# Patient Record
Sex: FEMALE | Race: WHITE | NOT HISPANIC OR LATINO | URBAN - METROPOLITAN AREA
[De-identification: names, ages, dates, MRNs, and addresses within clinical notes are randomized per-mention and may not be internally consistent; named-entity substitution may affect disease eponyms.]

---

## 2019-10-10 ENCOUNTER — OFFICE VISIT (OUTPATIENT)
Dept: PODIATRY | Facility: CLINIC | Age: 24
End: 2019-10-10
Payer: COMMERCIAL

## 2019-10-10 VITALS — HEIGHT: 64 IN | BODY MASS INDEX: 19.29 KG/M2 | WEIGHT: 113 LBS

## 2019-10-10 DIAGNOSIS — M79.672 LEFT FOOT PAIN: ICD-10-CM

## 2019-10-10 DIAGNOSIS — M72.2 PLANTAR FASCIITIS: Primary | ICD-10-CM

## 2019-10-10 PROCEDURE — 20550 NJX 1 TENDON SHEATH/LIGAMENT: CPT | Performed by: PODIATRIST

## 2019-10-10 PROCEDURE — 99203 OFFICE O/P NEW LOW 30 MIN: CPT | Performed by: PODIATRIST

## 2019-10-10 RX ORDER — LEVETIRACETAM 250 MG/1
250 TABLET ORAL 2 TIMES DAILY
COMMUNITY

## 2019-10-10 RX ORDER — TOPIRAMATE 100 MG/1
100 TABLET, FILM COATED ORAL 2 TIMES DAILY
COMMUNITY

## 2019-10-10 RX ORDER — LIDOCAINE HYDROCHLORIDE 10 MG/ML
1 INJECTION, SOLUTION INFILTRATION; PERINEURAL ONCE
Status: COMPLETED | OUTPATIENT
Start: 2019-10-10 | End: 2019-10-10

## 2019-10-10 RX ORDER — SUMATRIPTAN 100 MG/1
100 TABLET, FILM COATED ORAL
COMMUNITY

## 2019-10-10 RX ORDER — BUPIVACAINE HYDROCHLORIDE 5 MG/ML
1 INJECTION, SOLUTION EPIDURAL; INTRACAUDAL ONCE
Status: COMPLETED | OUTPATIENT
Start: 2019-10-10 | End: 2019-10-10

## 2019-10-10 RX ORDER — VENLAFAXINE 75 MG/1
75 TABLET ORAL
COMMUNITY

## 2019-10-10 RX ORDER — TRIAMCINOLONE ACETONIDE 40 MG/ML
20 INJECTION, SUSPENSION INTRA-ARTICULAR; INTRAMUSCULAR ONCE
Status: COMPLETED | OUTPATIENT
Start: 2019-10-10 | End: 2019-10-10

## 2019-10-10 RX ADMIN — BUPIVACAINE HYDROCHLORIDE 1 ML: 5 INJECTION, SOLUTION EPIDURAL; INTRACAUDAL at 16:04

## 2019-10-10 RX ADMIN — LIDOCAINE HYDROCHLORIDE 1 ML: 10 INJECTION, SOLUTION INFILTRATION; PERINEURAL at 16:04

## 2019-10-10 RX ADMIN — TRIAMCINOLONE ACETONIDE 20 MG: 40 INJECTION, SUSPENSION INTRA-ARTICULAR; INTRAMUSCULAR at 16:04

## 2019-10-10 NOTE — PROGRESS NOTES
Assessment/Plan:    Reviewed MRI results brought by patient  They were positive for a plantar fibroma  Foot injection     Date/Time 10/10/2019 4:09 PM     Performed by  Haroldo Finley DPM     Authorized by Haroldo Finley DPM      Universal Protocol Consent: Verbal consent obtained  Consent given by: patient  Patient understanding: patient states understanding of the procedure being performed  Patient identity confirmed: verbally with patient        Site preparation: Isopropyl alcohol    Local anesthesia used: yes     Anesthesia   Local anesthesia used: yes  Local Anesthetic: lidocaine 1% without epinephrine and bupivacaine 0 5% without epinephrine     Procedure Details   Procedure Notes: Injected left plantar fascia (fibroma) with 0 5 cc Kenalog 40 along with 1 cc 1% xylocaine and 1 cc 0 5% Marcaine  Explained the patient that her symptoms are consistent with a plantar fibroma  Discussed etiology and treatment options  Explained that the thickened band can be removed but that it frequently recurs  Recommended periodic cortisone injection as long as relieved her discomfort  Injected the left plantar fascia with 0 5 cc Kenalog 40 along with 1 cc 1% xylocaine and 1 cc 0 5% Marcaine  No problem-specific Assessment & Plan notes found for this encounter  Diagnoses and all orders for this visit:    Plantar fasciitis  -     bupivacaine (PF) (MARCAINE) 0 5 % injection 1 mL  -     lidocaine (XYLOCAINE) 1 % injection 1 mL  -     triamcinolone acetonide (KENALOG-40) 40 mg/mL injection 20 mg    Left foot pain    Other orders  -     venlafaxine (EFFEXOR) 75 mg tablet; Take 75 mg by mouth  -     topiramate (TOPAMAX) 100 mg tablet; Take 100 mg by mouth 2 (two) times a day  -     SUMAtriptan (IMITREX) 100 mg tablet; Take 100 mg by mouth  -     levETIRAcetam (KEPPRA) 250 mg tablet; Take 250 mg by mouth 2 (two) times a day          Subjective:      Patient ID: Karan Ricks is a 22 y o  female      HPI Patient presents with pain in her left foot along the medial band of the left plantar fascia  Patient states that she has been in pain for approximately 1 year  She notes a thickening in the fascial band and also a small lump  Patient had a MRI performed and it was read as suggestive for plantar fibromatosis  Patient has had 1 cortisone injection months ago with questionable results  She states that at her worst days the pain is a 7/10  For the most part is at a 6/10, though there are times when she does not feel it  Patient is in the [employer]  She states that she has PTSD  The following portions of the patient's history were reviewed and updated as appropriate: allergies, current medications, past family history, past medical history, past social history, past surgical history and problem list     Review of Systems   Constitutional: Negative  Genitourinary: Negative  Musculoskeletal: Negative  Neurological: Negative  Psychiatric/Behavioral:        PTSD             Objective:      Ht 5' 4" (1 626 m)   Wt 51 3 kg (113 lb)   BMI 19 40 kg/m²          Physical Exam   Constitutional: She is oriented to person, place, and time  Cardiovascular: Regular rhythm and intact distal pulses  Musculoskeletal: She exhibits tenderness and deformity  Tightness and thickening noted along the distal medial band of the left plantar fascia  Nodule if present is very small  Neurological: She is alert and oriented to person, place, and time  No sensory deficit  She exhibits normal muscle tone  Skin: Skin is warm  Capillary refill takes 2 to 3 seconds  No erythema

## 2019-11-07 ENCOUNTER — OFFICE VISIT (OUTPATIENT)
Dept: PODIATRY | Facility: CLINIC | Age: 24
End: 2019-11-07
Payer: COMMERCIAL

## 2019-11-07 VITALS — BODY MASS INDEX: 18.68 KG/M2 | HEIGHT: 64 IN | WEIGHT: 109.4 LBS

## 2019-11-07 DIAGNOSIS — M72.2 PLANTAR FASCIITIS: Primary | ICD-10-CM

## 2019-11-07 DIAGNOSIS — M79.672 LEFT FOOT PAIN: ICD-10-CM

## 2019-11-07 PROCEDURE — 99212 OFFICE O/P EST SF 10 MIN: CPT | Performed by: PODIATRIST

## 2019-11-07 NOTE — PROGRESS NOTES
Patient presents for assessment of left foot  At last visit she was diagnosed with a thickening within the plantar fascia secondary to plantar fibromatosis  A cortisone injection was given  Patient states that the injection was not helpful and she continues to have significant pain along the medial band of the plantar fascia  She also relates tingling and numbness  On exam, the thickening noted within the plantar fascia seems gone  Tinel sign is negative for tarsal tunnel  Treatment:  Patient referred to [address] [place] [address] for physical therapy and the Graston technique  She was given a prescription for Voltaren gel for t i d  Application  She has difficulty with oral NSAID's due to stomach issues  Patient is very interested in surgery to correct the disorder but this was not recommended due to morbidity of the procedure  She will be rescheduled in 6 weeks

## 2019-12-09 ENCOUNTER — TELEPHONE (OUTPATIENT)
Dept: GASTROENTEROLOGY | Facility: CLINIC | Age: 24
End: 2019-12-09

## 2020-02-05 ENCOUNTER — OFFICE VISIT (OUTPATIENT)
Dept: GASTROENTEROLOGY | Facility: MEDICAL CENTER | Age: 25
End: 2020-02-05
Payer: COMMERCIAL

## 2020-02-05 VITALS
WEIGHT: 102.8 LBS | HEIGHT: 64 IN | SYSTOLIC BLOOD PRESSURE: 98 MMHG | DIASTOLIC BLOOD PRESSURE: 62 MMHG | TEMPERATURE: 98.2 F | HEART RATE: 86 BPM | BODY MASS INDEX: 17.55 KG/M2

## 2020-02-05 DIAGNOSIS — R13.10 DYSPHAGIA, UNSPECIFIED TYPE: ICD-10-CM

## 2020-02-05 DIAGNOSIS — R68.81 EARLY SATIETY: ICD-10-CM

## 2020-02-05 DIAGNOSIS — R63.4 WEIGHT LOSS: Primary | ICD-10-CM

## 2020-02-05 DIAGNOSIS — R19.8 ABNORMAL BOWEL HABITS: ICD-10-CM

## 2020-02-05 PROCEDURE — 99204 OFFICE O/P NEW MOD 45 MIN: CPT | Performed by: INTERNAL MEDICINE

## 2020-02-05 RX ORDER — HYOSCYAMINE SULFATE 0.125 MG
0.12 TABLET ORAL EVERY 4 HOURS PRN
Qty: 120 TABLET | Refills: 1 | Status: SHIPPED | OUTPATIENT
Start: 2020-02-05

## 2020-02-05 NOTE — PATIENT INSTRUCTIONS
Today we discussed:  -- Your symptoms may be related to irritable bowel syndrome, with or without gastroparesis, but we will check some blood work and a stool tests to evaluate for other possible explanations for your symptoms  These include thyroid testing, celiac testing, inflammation in the colon, and basic blood work to look for anemia  -- We will have you undergo an upper endoscopy and colonsocopy given your symptoms and weight loss  -- I also recommend you get a repeat gastric emptying study  -- You can try Levsin every 4 hours as needed for abdominal cramps or urgency  -- Please make sure to drink plenty of water (enough so that your urine is a light yellow color), try to exercise for 15-30 minutes on most days of the week  -- Continue the omeprazole  -- Okay to use Gaviscon as needed for breakthrough symptoms  -- Things that you can do at home to help improve heartburn include: avoidance of trigger foods (potential foods include coffee, caffeine, chocolate, mint, tomato-based products, spicy foods, fatty foods), avoid tight fitting clothing, elevated head of bed 30 degrees, avoid eating 2-3 hours prior to bedtime, weight loss, avoid alcohol, avoid tobacco use  Follow-up in the office 2-3 weeks after the procedures, but be in touch via the [employer] portal or by phone with questions, concerns or changes  The patient is scheduled at [facility] for a colonoscopy with Dr Meenakshi Stuart on 3/13/20  Jacqueline/Dulcolax prep instructions have been gone over in the office, with the patient, by the MA  The patient is aware that they will receive a call with the arrival time the day prior to procedure and that they will need a  the day of the procedure  I have asked the patient to call with any questions that they might have prior to procedure

## 2020-02-05 NOTE — PROGRESS NOTES
[facility] - Outpatient Consultation  Michelle Mayfield 22 y o  female MRN: [id number]  Encounter: [id number]          ASSESSMENT AND PLAN:    Michelle Mayfield is a 22 y o  female who presents with complaint of alternating constipation and diarrhea + abdominal pain and prior diagnosis of IBS; current symptoms c/w IBS-M  Also with possible gastroparesis based on previous testing  Weight loss noted  DDx includes IBS-M, but celiac, H pylori, EPI, IBD, gastroparesis also possible and may be contributing to her symptoms  Available labs and imaging reviewed  1  Weight loss    2  Abnormal bowel habits    3  Early satiety    4  Dysphagia, unspecified type        Orders Placed This Encounter   Procedures    Calprotectin,Fecal    Fecal fat, qualitative    H  pylori antigen, stool    Pancreatic elastase, fecal    NM gastric emptying    Celiac Disease Antibody Profile    H  pylori antibody, IgG    TSH, 3rd generation with Free T4 reflex    Colonoscopy    EGD     Today we discussed:  -- Your symptoms may be related to irritable bowel syndrome, with or without gastroparesis, but we will check some blood work and a stool tests to evaluate for other possible explanations for your symptoms   These include thyroid testing, celiac testing, inflammation in the colon, and basic blood work to look for anemia  -- We will have you undergo an upper endoscopy and colonsocopy given your symptoms and weight loss  -- I also recommend you get a repeat gastric emptying study  -- You can try Levsin every 4 hours as needed for abdominal cramps or urgency  -- Please make sure to drink plenty of water (enough so that your urine is a light yellow color), try to exercise for 15-30 minutes on most days of the week  -- Continue the omeprazole  -- Okay to use Gaviscon as needed for breakthrough symptoms  -- Things that you can do at home to help improve heartburn include: avoidance of trigger foods (potential foods include coffee, caffeine, chocolate, mint, tomato-based products, spicy foods, fatty foods), avoid tight fitting clothing, elevated head of bed 30 degrees, avoid eating 2-3 hours prior to bedtime, weight loss, avoid alcohol, avoid tobacco use  Follow-up in the office 2-3 weeks after the procedures, but be in touch via the [employer] portal or by phone with questions, concerns or changes  ______________________________________________________________________    HPI:    Luna Severance is a 22 y o  female who presents with complaint of gastroparesis and IBS  She notes that back in  she started having GI issues  She had a colonoscopy and EGD and she was told she has IBS  She did a gastric emptying study and she was told she might have gastroparesis  She notes that she is either constipated or having liquid stools  It is very variable  Recently she has been having diarrhea with 1-2 BMs per day with liquid stools  + abdominal pain in the right sided, painful right before a BM and then resolved  When she has to go it comes on suddenly  At times it can be dark but intermittent and she is not concerned  No BRBPR  No NSAIDs  She can not eat because of early satiety  She lost 50 lbs over 2019  + Heartburn (she takes omeprazole and occasional TUMS  Bread feels like it gets stuck  No odynophagia  + Nausea but no vomiting  Grandmother  from a bowel obstruction but the details are not known  REVIEW OF SYSTEMS:  10 point ROS reviewed and negative, except as above      Historical Information   Past Medical History:   Diagnosis Date    Seizures ([address] )      History reviewed  No pertinent surgical history  Social History   Social History     Substance and Sexual Activity   Alcohol Use Never    Frequency: Never     Social History     Substance and Sexual Activity   Drug Use Never     Social History     Tobacco Use   Smoking Status Never Smoker   Smokeless Tobacco Never Used     History reviewed   No pertinent family history  Meds/Allergies       Current Outpatient Medications:     levETIRAcetam (KEPPRA) 250 mg tablet    SUMAtriptan (IMITREX) 100 mg tablet    topiramate (TOPAMAX) 100 mg tablet    venlafaxine (EFFEXOR) 75 mg tablet    bisacodyl (DULCOLAX) 5 mg EC tablet    diclofenac sodium (VOLTAREN) 1 %    hyoscyamine (ANASPAZ,LEVSIN) 0 125 MG tablet    polyethylene glycol (GOLYTELY) 4000 mL solution    Allergies   Allergen Reactions    Azithromycin Other (See Comments)    Erythromycin            Objective     Blood pressure 98/62, pulse 86, temperature 98 2 °F (36 8 °C), height 5' 4" (1 626 m), weight 46 6 kg (102 lb 12 8 oz)  Body mass index is 17 65 kg/m²  PHYSICAL EXAM:      General Appearance:   Alert, cooperative, no distress   HEENT:   Normocephalic, atraumatic, anicteric  Neck:  Supple, symmetrical, trachea midline   Lungs:   Clear to auscultation bilaterally; no rales, rhonchi or wheezing; respirations unlabored    Heart[de-identified]   Regular rate and rhythm; no murmur, rub, or gallop  Abdomen:   Soft, non-tender, non-distended; normal bowel sounds; no masses, no organomegaly    Genitalia:   Deferred    Rectal:   Deferred    Extremities:  No cyanosis, clubbing or edema    Pulses:  2+ and symmetric    Skin:  No jaundice, rashes, or lesions    Lymph nodes:  No palpable cervical lymphadenopathy        Lab Results:   No visits with results within 1 Day(s) from this visit  Latest known visit with results is:   No results found for any previous visit  No results found for: WBC, HGB, HCT, MCV, PLT    No results found for: NA, SODIUM, K, CL, CO2, ANIONGAP, AGAP, BUN, CREATININE, GLUC, GLUF, CALCIUM, AST, ALT, ALKPHOS, PROT, TP, BILITOT, TBILI, EGFR    No results found for: CRP    No results found for: [id number], TSH    No results found for: IRON, TIBC, FERRITIN    Radiology Results:   No results found

## 2020-02-14 ENCOUNTER — TELEPHONE (OUTPATIENT)
Dept: GASTROENTEROLOGY | Facility: CLINIC | Age: 25
End: 2020-02-14

## 2020-02-14 DIAGNOSIS — R10.9 ABDOMINAL CRAMPING: Primary | ICD-10-CM

## 2020-02-14 RX ORDER — DICYCLOMINE HCL 20 MG
20 TABLET ORAL EVERY 6 HOURS
Qty: 120 TABLET | Refills: 1 | Status: SHIPPED | OUTPATIENT
Start: 2020-02-14

## 2020-02-14 NOTE — TELEPHONE ENCOUNTER
[facility] will not cover hyoscyamine  They have asked for prescription to be switched to dicyclomine  I have attached the script below, please sign if appropriate  I will fax over to [phone]

## 2020-02-14 NOTE — TELEPHONE ENCOUNTER
Patients GI provider:  Dr Jorge Clark    Number to return call: [phone] fax#[phone]    Reason for call: Ana Rosa Baig from [address] called stating the Hyoscyamine is not covered under the [facility] guidelines and wanted to know if the script can be changed to dicyclomine? If script is changed please fax to the above number  Ana Rosa Baig also wanted to confirm if the pt was tested for being pregnant?     Scheduled procedure/appointment date if applicable: NA

## 2020-02-19 NOTE — TELEPHONE ENCOUNTER
Liat Hernandez from the [place] called to state they never received patient's faxed script for dicyclomine  Can you please re-fax it to them?

## 2020-03-05 ENCOUNTER — TELEPHONE (OUTPATIENT)
Dept: GASTROENTEROLOGY | Facility: AMBULARY SURGERY CENTER | Age: 25
End: 2020-03-05

## 2020-03-05 NOTE — TELEPHONE ENCOUNTER
Patients GI provider:  Dr Jenniffer Lozada    Number to return call: (  [phone]    Reason for call: Charlie Steel from nuclear medicine called to inform doctor that she was a no sow for her appt today    Scheduled procedure/appointment date if applicable: Apt/procedure 4-2-20

## 2020-03-10 LAB
IGA SERPL-MCNC: 340 MG/DL (ref 47–310)
TSH SERPL-ACNC: 1.68 MIU/L
TTG IGA SER-ACNC: 1 U/ML

## 2020-03-12 ENCOUNTER — ANESTHESIA EVENT (OUTPATIENT)
Dept: GASTROENTEROLOGY | Facility: MEDICAL CENTER | Age: 25
End: 2020-03-12

## 2020-03-13 ENCOUNTER — TELEPHONE (OUTPATIENT)
Dept: GASTROENTEROLOGY | Facility: MEDICAL CENTER | Age: 25
End: 2020-03-13

## 2020-03-13 ENCOUNTER — HOSPITAL ENCOUNTER (OUTPATIENT)
Dept: GASTROENTEROLOGY | Facility: MEDICAL CENTER | Age: 25
Setting detail: OUTPATIENT SURGERY
Discharge: HOME | End: 2020-03-13
Attending: INTERNAL MEDICINE
Payer: OTHER GOVERNMENT

## 2020-03-13 ENCOUNTER — ANESTHESIA (OUTPATIENT)
Dept: GASTROENTEROLOGY | Facility: MEDICAL CENTER | Age: 25
End: 2020-03-13

## 2020-03-13 VITALS
WEIGHT: 100 LBS | RESPIRATION RATE: 18 BRPM | HEIGHT: 64 IN | TEMPERATURE: 99.3 F | OXYGEN SATURATION: 100 % | HEART RATE: 79 BPM | SYSTOLIC BLOOD PRESSURE: 102 MMHG | BODY MASS INDEX: 17.07 KG/M2 | DIASTOLIC BLOOD PRESSURE: 57 MMHG

## 2020-03-13 DIAGNOSIS — R13.10 DYSPHAGIA, UNSPECIFIED TYPE: ICD-10-CM

## 2020-03-13 DIAGNOSIS — R63.4 WEIGHT LOSS: ICD-10-CM

## 2020-03-13 DIAGNOSIS — R68.81 EARLY SATIETY: ICD-10-CM

## 2020-03-13 DIAGNOSIS — R19.8 ABNORMAL BOWEL HABITS: ICD-10-CM

## 2020-03-13 DIAGNOSIS — K20.90 ESOPHAGITIS: Primary | ICD-10-CM

## 2020-03-13 PROCEDURE — 88305 TISSUE EXAM BY PATHOLOGIST: CPT | Performed by: PATHOLOGY

## 2020-03-13 PROCEDURE — 45380 COLONOSCOPY AND BIOPSY: CPT | Performed by: INTERNAL MEDICINE

## 2020-03-13 PROCEDURE — 43239 EGD BIOPSY SINGLE/MULTIPLE: CPT | Performed by: INTERNAL MEDICINE

## 2020-03-13 RX ORDER — OMEPRAZOLE 40 MG/1
40 CAPSULE, DELAYED RELEASE ORAL
Qty: 30 CAPSULE | Refills: 3 | Status: SHIPPED | OUTPATIENT
Start: 2020-03-13

## 2020-03-13 RX ORDER — SODIUM CHLORIDE 9 MG/ML
125 INJECTION, SOLUTION INTRAVENOUS CONTINUOUS
Status: DISCONTINUED | OUTPATIENT
Start: 2020-03-13 | End: 2020-03-17 | Stop reason: HOSPADM

## 2020-03-13 RX ORDER — PROPOFOL 10 MG/ML
INJECTION, EMULSION INTRAVENOUS AS NEEDED
Status: DISCONTINUED | OUTPATIENT
Start: 2020-03-13 | End: 2020-03-13 | Stop reason: SURG

## 2020-03-13 RX ADMIN — PROPOFOL 50 MG: 10 INJECTION, EMULSION INTRAVENOUS at 10:14

## 2020-03-13 RX ADMIN — PROPOFOL 50 MG: 10 INJECTION, EMULSION INTRAVENOUS at 10:10

## 2020-03-13 RX ADMIN — PROPOFOL 50 MG: 10 INJECTION, EMULSION INTRAVENOUS at 10:06

## 2020-03-13 RX ADMIN — PROPOFOL 50 MG: 10 INJECTION, EMULSION INTRAVENOUS at 09:59

## 2020-03-13 RX ADMIN — PROPOFOL 200 MG: 10 INJECTION, EMULSION INTRAVENOUS at 09:55

## 2020-03-13 RX ADMIN — PROPOFOL 50 MG: 10 INJECTION, EMULSION INTRAVENOUS at 10:02

## 2020-03-13 RX ADMIN — PROPOFOL 50 MG: 10 INJECTION, EMULSION INTRAVENOUS at 10:04

## 2020-03-13 RX ADMIN — PROPOFOL 50 MG: 10 INJECTION, EMULSION INTRAVENOUS at 10:17

## 2020-03-13 RX ADMIN — SODIUM CHLORIDE 125 ML/HR: 0.9 INJECTION, SOLUTION INTRAVENOUS at 09:36

## 2020-03-13 RX ADMIN — PROPOFOL 50 MG: 10 INJECTION, EMULSION INTRAVENOUS at 10:11

## 2020-03-13 NOTE — ANESTHESIA PREPROCEDURE EVALUATION
Review of Systems/Medical History          Cardiovascular  Negative cardio ROS    Pulmonary    Comment: vape     GI/Hepatic  Dysphagia,    Hiatal hernia,          Comment: Chronic mild hypokalemia     Endo/Other     GYN       Hematology  Negative hematology ROS      Musculoskeletal  Negative musculoskeletal ROS        Neurology  Seizures ,     Psychology   Psychiatric history,              Physical Exam    Airway    Mallampati score: I  TM Distance: >3 FB  Neck ROM: full     Dental   No notable dental hx     Cardiovascular  Comment: Negative ROS, Rhythm: regular, Rate: normal, Cardiovascular exam normal    Pulmonary  Pulmonary exam normal     Other Findings        Anesthesia Plan  ASA Score- 2     Anesthesia Type- IV sedation with anesthesia with ASA Monitors  Additional Monitors:   Airway Plan:         Plan Factors-    Induction- intravenous  Postoperative Plan-     Informed Consent- Anesthetic plan and risks discussed with patient

## 2020-03-13 NOTE — H&P
History and Physical -  [facility]  Socrates Shepherd 22 y o  female MRN: [id number]                  HPI: Socrates Shepherd is a 22y o  year old female who presents for change in bowel habits, weight loss, early satiety and dysphagia  REVIEW OF SYSTEMS: Per the HPI, and otherwise unremarkable  Historical Information   Past Medical History:   Diagnosis Date    Seizures ([facility] [address] )      No past surgical history on file  Social History   Social History     Substance and Sexual Activity   Alcohol Use Never    Frequency: Never     Social History     Substance and Sexual Activity   Drug Use Never     Social History     Tobacco Use   Smoking Status Never Smoker   Smokeless Tobacco Never Used     No family history on file  Meds/Allergies       (Not in a hospital admission)    Allergies   Allergen Reactions    Azithromycin Other (See Comments)    Erythromycin        Objective     There were no vitals taken for this visit  PHYSICAL EXAM    Gen: NAD  CV: RRR  CHEST: Clear  ABD: soft, NT/ND  EXT: no edema      ASSESSMENT/PLAN:  This is a 22y o  year old female here for EGD and colonsocopy, and she is stable and optimized for her procedure

## 2020-03-13 NOTE — TELEPHONE ENCOUNTER
Notes recorded by Ed Baires MA on 3/13/2020 at 9:26 AM EDT  Called patient and left message stating results where WNL's  ------    Notes recorded by Wilfrid Campo MD on 3/11/2020 at 5:25 PM EDT  Blood and stool tests nromal      Thanks,    Kerry  ------    Notes recorded by Wilfrid Campo MD on 3/11/2020 at 5:24 PM EDT  Hi,    Blood work all normal      Thanks,  [facility]

## 2020-03-16 LAB — ELASTASE PANC STL-MCNT: >500 MCG/G

## 2020-03-17 LAB — CALPROTECTIN STL-MCNT: 16.6 MCG/G

## 2020-03-18 ENCOUNTER — TELEPHONE (OUTPATIENT)
Dept: GASTROENTEROLOGY | Facility: MEDICAL CENTER | Age: 25
End: 2020-03-18

## 2020-03-18 NOTE — TELEPHONE ENCOUNTER
Notes recorded by Narciso Denise MA on 3/18/2020 at 3:20 PM EDT  Called patient and left message stating results where WNL's  ------    Notes recorded by Karmen Li MD on 3/17/2020 at 3:55 PM EDT  Hi,    Can you let her know her biopsies were all normal and she has a follow-up scheduled to discuss symptoms  There was no inflammation and her symptoms may all be related to IBS (irritable bowel syndrome)       Thanks,  ERIC Taylor

## 2020-03-31 ENCOUNTER — TELEPHONE (OUTPATIENT)
Dept: GASTROENTEROLOGY | Facility: MEDICAL CENTER | Age: 25
End: 2020-03-31

## 2021-05-11 ENCOUNTER — OFFICE VISIT (OUTPATIENT)
Dept: URGENT CARE | Age: 26
End: 2021-05-11
Payer: COMMERCIAL

## 2021-05-11 VITALS
HEART RATE: 95 BPM | OXYGEN SATURATION: 100 % | TEMPERATURE: 98.1 F | DIASTOLIC BLOOD PRESSURE: 66 MMHG | SYSTOLIC BLOOD PRESSURE: 107 MMHG | RESPIRATION RATE: 16 BRPM

## 2021-05-11 DIAGNOSIS — L08.9 PIERCED EAR INFECTION, RIGHT, INITIAL ENCOUNTER: Primary | ICD-10-CM

## 2021-05-11 DIAGNOSIS — S01.331A PIERCED EAR INFECTION, RIGHT, INITIAL ENCOUNTER: Primary | ICD-10-CM

## 2021-05-11 PROCEDURE — G0382 LEV 3 HOSP TYPE B ED VISIT: HCPCS | Performed by: PHYSICIAN ASSISTANT

## 2021-05-11 RX ORDER — CEPHALEXIN 500 MG/1
500 CAPSULE ORAL EVERY 12 HOURS SCHEDULED
Qty: 14 CAPSULE | Refills: 0 | Status: SHIPPED | OUTPATIENT
Start: 2021-05-11 | End: 2021-05-18

## 2021-05-11 NOTE — PATIENT INSTRUCTIONS
Clean the area daily  Warm soaks to the area  Take all antibiotics as prescribed  Call PCP to schedule a follow-up appt in the next 1-2 days for reevaluation and to ensure resolution of symptoms  Go to the nearest ER for evaluation if any fevers, redness, warmth, discharge, streaking redness, redness that is circumferential, bleeding, pain, signs of infection, new or worsening symptoms, or other concerning symptoms

## 2022-10-21 ENCOUNTER — OFFICE VISIT (OUTPATIENT)
Dept: URGENT CARE | Age: 27
End: 2022-10-21
Payer: COMMERCIAL

## 2022-10-21 VITALS
RESPIRATION RATE: 18 BRPM | HEIGHT: 62 IN | HEART RATE: 74 BPM | OXYGEN SATURATION: 99 % | WEIGHT: 104 LBS | TEMPERATURE: 98 F | BODY MASS INDEX: 19.14 KG/M2

## 2022-10-21 DIAGNOSIS — M79.662 BILATERAL CALF PAIN: Primary | ICD-10-CM

## 2022-10-21 DIAGNOSIS — M79.661 BILATERAL CALF PAIN: Primary | ICD-10-CM

## 2022-10-21 PROCEDURE — G0382 LEV 3 HOSP TYPE B ED VISIT: HCPCS | Performed by: STUDENT IN AN ORGANIZED HEALTH CARE EDUCATION/TRAINING PROGRAM

## 2022-10-21 RX ORDER — PREDNISONE 10 MG/1
10 TABLET ORAL DAILY
Qty: 21 TABLET | Refills: 0 | Status: SHIPPED | OUTPATIENT
Start: 2022-10-21

## 2022-10-21 RX ORDER — CYCLOBENZAPRINE HCL 10 MG
10 TABLET ORAL 3 TIMES DAILY PRN
Qty: 30 TABLET | Refills: 0 | Status: SHIPPED | OUTPATIENT
Start: 2022-10-21

## 2022-10-21 RX ORDER — ACETAMINOPHEN 325 MG/1
650 TABLET ORAL EVERY 6 HOURS PRN
COMMUNITY

## 2022-10-21 NOTE — PROGRESS NOTES
[email or web address] [facility]        NAME: Ayush Hirsch is a 33 y o  female  : 1995    MRN: [id number]  DATE: 2022  TIME: 9:57 AM    Assessment and Plan   Bilateral calf pain [M79 661, M79 662]  1  Bilateral calf pain  predniSONE 10 mg tablet    cyclobenzaprine (FLEXERIL) 10 mg tablet         Patient Instructions       Follow up with PCP in 3-5 days  Proceed to  ER if symptoms worsen  Chief Complaint     Chief Complaint   Patient presents with   • Leg Pain     Went to gym on Tuesday and did leg work    since then calf muscles have progressively cramped and pt has difficulty walking or moving legs and feet at all  History of Present Illness       HPI  Patient presents today complaining bilateral calf pain ongoing since Tuesday  Patient had a Tuesday gym workout was doing her leg routine  Patient since then has been having progressively worsening pain  Patient states that his difficulty walking or moving the legs as some pain  Denies any weakness numbness tingling loss the patient    Review of Systems   Review of Systems  Per HPI    Current Medications       Current Outpatient Medications:   •  acetaminophen (TYLENOL) 325 mg tablet, Take 650 mg by mouth every 6 (six) hours as needed for mild pain, Disp: , Rfl:   •  cyclobenzaprine (FLEXERIL) 10 mg tablet, Take 1 tablet (10 mg total) by mouth 3 (three) times a day as needed for muscle spasms, Disp: 30 tablet, Rfl: 0  •  levETIRAcetam (KEPPRA) 250 mg tablet, Take 250 mg by mouth 2 (two) times a day, Disp: , Rfl:   •  omeprazole (PriLOSEC) 40 MG capsule, Take 1 capsule (40 mg total) by mouth daily before breakfast, Disp: 30 capsule, Rfl: 3  •  predniSONE 10 mg tablet, Take 1 tablet (10 mg total) by mouth daily 6 tab day 1, 5 tab day 2, 4 tab day 3, 3 tab day 4, 2 tab day 5, 1 tab day 6, Disp: 21 tablet, Rfl: 0  •  topiramate (TOPAMAX) 100 mg tablet, Take 100 mg by mouth 2 (two) times a day, Disp: , Rfl:   •  venlafaxine (EFFEXOR) 75 mg tablet, Take 75 mg by mouth, Disp: , Rfl:   •  diclofenac sodium (VOLTAREN) 1 %, Apply 2 g topically 3 (three) times a day, Disp: 180 g, Rfl: 2  •  dicyclomine (BENTYL) 20 mg tablet, Take 1 tablet (20 mg total) by mouth every 6 (six) hours (Patient not taking: Reported on 10/21/2022), Disp: 120 tablet, Rfl: 1  •  hyoscyamine (ANASPAZ,LEVSIN) 0 125 MG tablet, Take 1 tablet (0 125 mg total) by mouth every 4 (four) hours as needed for cramping (abdominal pain) (Patient not taking: Reported on 10/21/2022), Disp: 120 tablet, Rfl: 1  •  SUMAtriptan (IMITREX) 100 mg tablet, Take 100 mg by mouth (Patient not taking: Reported on 10/21/2022), Disp: , Rfl:     Current Allergies     Allergies as of 10/21/2022 - Reviewed 10/21/2022   Allergen Reaction Noted   • Azithromycin Other (See Comments) 03/28/2014   • Erythromycin  06/09/2019            The following portions of the patient's history were reviewed and updated as appropriate: allergies, current medications, past family history, past medical history, past social history, past surgical history and problem list      Past Medical History:   Diagnosis Date   • Migraines    • Seizures ([address] )        Past Surgical History:   Procedure Laterality Date   • COLONOSCOPY         No family history on file  Medications have been verified  Objective   Pulse 74   Temp 98 °F (36 7 °C)   Resp 18   Ht 5' 2" (1 575 m)   Wt 47 2 kg (104 lb)   LMP 10/09/2022   SpO2 99%   BMI 19 02 kg/m²   Patient's last menstrual period was 10/09/2022  Physical Exam     Physical Exam  Constitutional:       General: She is not in acute distress  Appearance: Normal appearance  HENT:      Head: Normocephalic  Nose: No congestion or rhinorrhea  Mouth/Throat:      Mouth: Mucous membranes are moist       Pharynx: No oropharyngeal exudate or posterior oropharyngeal erythema  Eyes:      General:         Right eye: No discharge  Left eye: No discharge        Conjunctiva/sclera: Conjunctivae normal    Cardiovascular:      Rate and Rhythm: Normal rate and regular rhythm  Pulses: Normal pulses  Pulmonary:      Effort: Pulmonary effort is normal  No respiratory distress  Abdominal:      General: Abdomen is flat  There is no distension  Palpations: Abdomen is soft  Tenderness: There is no abdominal tenderness  Musculoskeletal:         General: Tenderness present  Cervical back: Neck supple  Comments: Tender bilateral calf, no rash or erythema negative Homans   Skin:     General: Skin is warm  Capillary Refill: Capillary refill takes less than 2 seconds  Neurological:      Mental Status: She is alert and oriented to person, place, and time

## 2023-08-18 ENCOUNTER — OFFICE VISIT (OUTPATIENT)
Dept: URGENT CARE | Age: 28
End: 2023-08-18
Payer: COMMERCIAL

## 2023-08-18 VITALS
HEART RATE: 88 BPM | RESPIRATION RATE: 18 BRPM | DIASTOLIC BLOOD PRESSURE: 71 MMHG | OXYGEN SATURATION: 100 % | TEMPERATURE: 98.3 F | SYSTOLIC BLOOD PRESSURE: 121 MMHG

## 2023-08-18 DIAGNOSIS — J06.9 ACUTE URI: Primary | ICD-10-CM

## 2023-08-18 LAB
SARS-COV-2 AG UPPER RESP QL IA: NEGATIVE
VALID CONTROL: NORMAL

## 2023-08-18 PROCEDURE — 87811 SARS-COV-2 COVID19 W/OPTIC: CPT

## 2023-08-18 PROCEDURE — 99213 OFFICE O/P EST LOW 20 MIN: CPT

## 2023-08-18 RX ORDER — ALBUTEROL SULFATE 90 UG/1
2 AEROSOL, METERED RESPIRATORY (INHALATION) EVERY 6 HOURS PRN
Qty: 8.5 G | Refills: 1 | Status: SHIPPED | OUTPATIENT
Start: 2023-08-18

## 2023-08-18 RX ORDER — BENZONATATE 200 MG/1
200 CAPSULE ORAL 3 TIMES DAILY PRN
Qty: 20 CAPSULE | Refills: 0 | Status: SHIPPED | OUTPATIENT
Start: 2023-08-18

## 2023-08-18 NOTE — PATIENT INSTRUCTIONS
Please trial Tessalon every 8 hours as needed for cough. Please trial Albuterol every 6 hours as needed for chest tightness. Trial OTC cough and cold medication. Drink plenty of fluids.

## 2023-08-18 NOTE — PROGRESS NOTES
[facility]        NAME: Shirley Plasencia is a 33 y.o. female  : 1995    MRN: [id number]  DATE: 2023  TIME: 12:05 PM    Assessment and Plan   Acute URI [J06.9]  1. Acute URI  Poct Covid 19 Rapid Antigen Test    benzonatate (TESSALON) 200 MG capsule    albuterol (ProAir HFA) 90 mcg/act inhaler        COVID antigen negative    Patient Instructions     Please trial Tessalon every 8 hours as needed for cough. Please trial Albuterol every 6 hours as needed for chest tightness. Trial OTC cough and cold medication. Drink plenty of fluids. Follow up with PCP in 3-5 days. Proceed to  ER if symptoms worsen. Chief Complaint     Chief Complaint   Patient presents with   • Cough     Patient states that she started to have chest congestion on Monday. Over the week it has progressed and she is having burning with coughing and productive cough. She states that she was SOB but is no longer. History of Present Illness       Patient presenting for evaluation of upper respiratory symptoms. Patient states that over the past 4 days she has been experincintg productive cough, fatigue, sore throat, chest tightness and congestion. She admits to a burning sensation in her chest with coughing. She states that she has been taking Tylenol with minimal relief of her symptoms. She denies any chest pain, fevers or chills. She denies any known sick contacts. Review of Systems   Review of Systems   Constitutional: Negative for chills and fever. HENT: Positive for congestion and sore throat. Respiratory: Positive for cough, chest tightness and shortness of breath. Cardiovascular: Negative for chest pain. Gastrointestinal: Negative for diarrhea, nausea and vomiting. All other systems reviewed and are negative.         Current Medications       Current Outpatient Medications:   •  acetaminophen (TYLENOL) 325 mg tablet, Take 650 mg by mouth every 6 (six) hours as needed for mild pain, Disp: , Rfl:   •  albuterol (ProAir HFA) 90 mcg/act inhaler, Inhale 2 puffs every 6 (six) hours as needed for wheezing, Disp: 8.5 g, Rfl: 1  •  benzonatate (TESSALON) 200 MG capsule, Take 1 capsule (200 mg total) by mouth 3 (three) times a day as needed for cough, Disp: 20 capsule, Rfl: 0  •  cyclobenzaprine (FLEXERIL) 10 mg tablet, Take 1 tablet (10 mg total) by mouth 3 (three) times a day as needed for muscle spasms, Disp: 30 tablet, Rfl: 0  •  levETIRAcetam (KEPPRA) 250 mg tablet, Take 250 mg by mouth 2 (two) times a day, Disp: , Rfl:   •  omeprazole (PriLOSEC) 40 MG capsule, Take 1 capsule (40 mg total) by mouth daily before breakfast, Disp: 30 capsule, Rfl: 3  •  predniSONE 10 mg tablet, Take 1 tablet (10 mg total) by mouth daily 6 tab day 1, 5 tab day 2, 4 tab day 3, 3 tab day 4, 2 tab day 5, 1 tab day 6, Disp: 21 tablet, Rfl: 0  •  topiramate (TOPAMAX) 100 mg tablet, Take 100 mg by mouth 2 (two) times a day, Disp: , Rfl:   •  venlafaxine (EFFEXOR) 75 mg tablet, Take 75 mg by mouth, Disp: , Rfl:   •  diclofenac sodium (VOLTAREN) 1 %, Apply 2 g topically 3 (three) times a day, Disp: 180 g, Rfl: 2  •  dicyclomine (BENTYL) 20 mg tablet, Take 1 tablet (20 mg total) by mouth every 6 (six) hours (Patient not taking: Reported on 10/21/2022), Disp: 120 tablet, Rfl: 1  •  hyoscyamine (ANASPAZ,LEVSIN) 0.125 MG tablet, Take 1 tablet (0.125 mg total) by mouth every 4 (four) hours as needed for cramping (abdominal pain) (Patient not taking: Reported on 10/21/2022), Disp: 120 tablet, Rfl: 1  •  SUMAtriptan (IMITREX) 100 mg tablet, Take 100 mg by mouth (Patient not taking: Reported on 10/21/2022), Disp: , Rfl:     Current Allergies     Allergies as of 08/18/2023 - Reviewed 08/18/2023   Allergen Reaction Noted   • Azithromycin Other (See Comments) 03/28/2014   • Erythromycin  06/09/2019            The following portions of the patient's history were reviewed and updated as appropriate: allergies, current medications, past family history, past medical history, past social history, past surgical history and problem list.     Past Medical History:   Diagnosis Date   • Migraines    • Seizures ([address])        Past Surgical History:   Procedure Laterality Date   • COLONOSCOPY         History reviewed. No pertinent family history. Medications have been verified. Objective   /71   Pulse 88   Temp 98.3 °F (36.8 °C)   Resp 18   SpO2 100%        Physical Exam     Physical Exam  Vitals and nursing note reviewed. Constitutional:       General: She is not in acute distress. Appearance: Normal appearance. She is normal weight. She is not ill-appearing, toxic-appearing or diaphoretic. HENT:      Head: Normocephalic and atraumatic. Right Ear: Tympanic membrane is erythematous. Left Ear: Tympanic membrane is erythematous. Nose: Congestion present. No rhinorrhea. Mouth/Throat:      Mouth: Mucous membranes are moist.      Pharynx: Oropharynx is clear. Posterior oropharyngeal erythema present. No oropharyngeal exudate. Eyes:      General:         Right eye: No discharge. Left eye: No discharge. Cardiovascular:      Rate and Rhythm: Normal rate and regular rhythm. Pulses: Normal pulses. Heart sounds: Normal heart sounds. No murmur heard. No friction rub. No gallop. Pulmonary:      Effort: Pulmonary effort is normal. No respiratory distress. Breath sounds: Normal breath sounds. No stridor. No wheezing, rhonchi or rales. Chest:      Chest wall: No tenderness. Skin:     General: Skin is warm and dry. Neurological:      Mental Status: She is alert.    Psychiatric:         Mood and Affect: Mood normal.         Behavior: Behavior normal.